# Patient Record
Sex: FEMALE | Race: BLACK OR AFRICAN AMERICAN | NOT HISPANIC OR LATINO | URBAN - METROPOLITAN AREA
[De-identification: names, ages, dates, MRNs, and addresses within clinical notes are randomized per-mention and may not be internally consistent; named-entity substitution may affect disease eponyms.]

---

## 2021-07-08 ENCOUNTER — CLINICAL SUPPORT (OUTPATIENT)
Dept: URGENT CARE | Facility: CLINIC | Age: 61
End: 2021-07-08
Payer: COMMERCIAL

## 2021-07-08 DIAGNOSIS — Z11.52 ENCOUNTER FOR SCREENING FOR COVID-19: Primary | ICD-10-CM

## 2021-07-08 LAB
CTP QC/QA: YES
SARS-COV-2 RDRP RESP QL NAA+PROBE: NEGATIVE

## 2021-07-08 PROCEDURE — 99211 OFF/OP EST MAY X REQ PHY/QHP: CPT | Mod: S$GLB,CS,, | Performed by: PHYSICIAN ASSISTANT

## 2021-07-08 PROCEDURE — U0002: ICD-10-PCS | Mod: QW,S$GLB,, | Performed by: PHYSICIAN ASSISTANT

## 2021-07-08 PROCEDURE — 99211 PR OFFICE/OUTPT VISIT, EST, LEVL I: ICD-10-PCS | Mod: S$GLB,CS,, | Performed by: PHYSICIAN ASSISTANT

## 2021-07-08 PROCEDURE — U0002 COVID-19 LAB TEST NON-CDC: HCPCS | Mod: QW,S$GLB,, | Performed by: PHYSICIAN ASSISTANT

## 2022-02-05 ENCOUNTER — CLINICAL SUPPORT (OUTPATIENT)
Dept: URGENT CARE | Facility: CLINIC | Age: 62
End: 2022-02-05
Payer: COMMERCIAL

## 2022-02-05 DIAGNOSIS — Z11.52 ENCOUNTER FOR SCREENING LABORATORY TESTING FOR COVID-19 VIRUS: Primary | ICD-10-CM

## 2022-02-05 LAB
CTP QC/QA: YES
SARS-COV-2 RDRP RESP QL NAA+PROBE: NEGATIVE

## 2022-02-05 PROCEDURE — U0002 COVID-19 LAB TEST NON-CDC: HCPCS | Mod: QW,S$GLB,, | Performed by: PHYSICIAN ASSISTANT

## 2022-02-05 PROCEDURE — U0002: ICD-10-PCS | Mod: QW,S$GLB,, | Performed by: PHYSICIAN ASSISTANT

## 2022-02-05 NOTE — PATIENT INSTRUCTIONS
CDC Testing and Quarantine Guidelines for patients with exposure to a known-positive COVID-19 person:   A 'close exposure' is defined as anyone who has had an exposure (masked or unmasked) to a known COVID -19 positive person    within 6 feet of someone    for a cumulative total of 15 minutes or more over a 24-hour period.    vaccinated Have been boosted or completed the primary series of Pfizer or Moderna vaccine within the last 6 months or completed the primary series of J&J vaccine within the last 2 months and/or had a positive test within 90 days    do NOT need to quarantine after contact with someone who had COVID-19 unless they have symptoms.    fully vaccinated people who have not had a positive test within 90 days, should get tested 3-5 days after their exposure, even if they don't have symptoms and wear a mask indoors in public for 10 days following exposure or until their test result is negative on day 5.   If you develop symptoms test and quarantine.     Unvaccinated, or are more than six months out from their second mRNA dose (or more than 2 months after the J&J vaccine) and not yet boosted,  and/or NOT had a positive test within 90 days and meet 'close exposure'   you are required by CDC guidelines to quarantine for at least 5 days from time of exposure followed by 5 days of strict masking. It is recommended, but not required to test after 5 days, unless you develop symptoms, in which case you should test at that time.   If you do decide to test at 5 days and are asymptomatic, the risk is that if you test without symptoms on Day 5 for example) and you are positive, your 5 day isolation begins on that day, and you turned your 5 day quarantine into 10 days.   If your exposure does not meet the above definition, you can return to your normal daily activities to include social distancing, wearing a mask and frequent handwashing.  Alternatively, if a 5-day quarantine is not feasible, it is imperative that  an exposed person wear a well-fitting mask at all times when around others for 10 days after exposure.